# Patient Record
Sex: MALE | Race: WHITE | NOT HISPANIC OR LATINO | Employment: STUDENT | URBAN - METROPOLITAN AREA
[De-identification: names, ages, dates, MRNs, and addresses within clinical notes are randomized per-mention and may not be internally consistent; named-entity substitution may affect disease eponyms.]

---

## 2021-05-06 ENCOUNTER — IMMUNIZATION (OUTPATIENT)
Dept: INTERNAL MEDICINE | Facility: CLINIC | Age: 21
End: 2021-05-06

## 2021-05-06 DIAGNOSIS — Z23 NEED FOR VACCINATION: Primary | ICD-10-CM

## 2021-05-06 PROCEDURE — 0001A COVID-19, MRNA, LNP-S, PF, 30 MCG/0.3 ML DOSE VACCINE: CPT | Mod: CV19,,, | Performed by: INTERNAL MEDICINE

## 2021-05-06 PROCEDURE — 91300 COVID-19, MRNA, LNP-S, PF, 30 MCG/0.3 ML DOSE VACCINE: ICD-10-PCS | Mod: ,,, | Performed by: INTERNAL MEDICINE

## 2021-05-06 PROCEDURE — 0001A COVID-19, MRNA, LNP-S, PF, 30 MCG/0.3 ML DOSE VACCINE: ICD-10-PCS | Mod: CV19,,, | Performed by: INTERNAL MEDICINE

## 2021-05-06 PROCEDURE — 91300 COVID-19, MRNA, LNP-S, PF, 30 MCG/0.3 ML DOSE VACCINE: CPT | Mod: ,,, | Performed by: INTERNAL MEDICINE

## 2021-06-01 ENCOUNTER — IMMUNIZATION (OUTPATIENT)
Dept: PRIMARY CARE CLINIC | Facility: CLINIC | Age: 21
End: 2021-06-01

## 2021-06-01 DIAGNOSIS — Z23 NEED FOR VACCINATION: Primary | ICD-10-CM

## 2021-06-01 PROCEDURE — 0002A COVID-19, MRNA, LNP-S, PF, 30 MCG/0.3 ML DOSE VACCINE: ICD-10-PCS | Mod: CV19,S$GLB,, | Performed by: INTERNAL MEDICINE

## 2021-06-01 PROCEDURE — 0002A COVID-19, MRNA, LNP-S, PF, 30 MCG/0.3 ML DOSE VACCINE: CPT | Mod: CV19,S$GLB,, | Performed by: INTERNAL MEDICINE

## 2021-06-01 PROCEDURE — 91300 COVID-19, MRNA, LNP-S, PF, 30 MCG/0.3 ML DOSE VACCINE: ICD-10-PCS | Mod: S$GLB,,, | Performed by: INTERNAL MEDICINE

## 2021-06-01 PROCEDURE — 91300 COVID-19, MRNA, LNP-S, PF, 30 MCG/0.3 ML DOSE VACCINE: CPT | Mod: S$GLB,,, | Performed by: INTERNAL MEDICINE

## 2025-01-29 ENCOUNTER — HOSPITAL ENCOUNTER (EMERGENCY)
Facility: HOSPITAL | Age: 25
Discharge: LEFT BEFORE DISCHARGE | End: 2025-01-29
Payer: COMMERCIAL

## 2025-01-29 VITALS
WEIGHT: 123.88 LBS | HEART RATE: 69 BPM | OXYGEN SATURATION: 99 % | DIASTOLIC BLOOD PRESSURE: 70 MMHG | TEMPERATURE: 99 F | RESPIRATION RATE: 18 BRPM | SYSTOLIC BLOOD PRESSURE: 119 MMHG

## 2025-01-29 DIAGNOSIS — R55 SYNCOPE: ICD-10-CM

## 2025-01-29 PROCEDURE — 93005 ELECTROCARDIOGRAM TRACING: CPT

## 2025-01-29 PROCEDURE — 99283 EMERGENCY DEPT VISIT LOW MDM: CPT | Mod: 25

## 2025-01-29 PROCEDURE — 93010 ELECTROCARDIOGRAM REPORT: CPT | Mod: ,,, | Performed by: INTERNAL MEDICINE

## 2025-01-30 ENCOUNTER — LAB VISIT (OUTPATIENT)
Dept: LAB | Facility: OTHER | Age: 25
End: 2025-01-30
Payer: COMMERCIAL

## 2025-01-30 ENCOUNTER — OFFICE VISIT (OUTPATIENT)
Dept: INTERNAL MEDICINE | Facility: CLINIC | Age: 25
End: 2025-01-30
Payer: COMMERCIAL

## 2025-01-30 VITALS
OXYGEN SATURATION: 98 % | BODY MASS INDEX: 22.66 KG/M2 | HEART RATE: 77 BPM | WEIGHT: 153 LBS | HEIGHT: 69 IN | SYSTOLIC BLOOD PRESSURE: 118 MMHG | DIASTOLIC BLOOD PRESSURE: 80 MMHG

## 2025-01-30 DIAGNOSIS — Z00.00 ANNUAL PHYSICAL EXAM: ICD-10-CM

## 2025-01-30 DIAGNOSIS — R40.20 LOSS OF CONSCIOUSNESS: Primary | ICD-10-CM

## 2025-01-30 DIAGNOSIS — R40.20 LOSS OF CONSCIOUSNESS: ICD-10-CM

## 2025-01-30 LAB
ALBUMIN SERPL BCP-MCNC: 4.7 G/DL (ref 3.5–5.2)
ALP SERPL-CCNC: 64 U/L (ref 40–150)
ALT SERPL W/O P-5'-P-CCNC: 12 U/L (ref 10–44)
ANION GAP SERPL CALC-SCNC: 11 MMOL/L (ref 8–16)
AST SERPL-CCNC: 16 U/L (ref 10–40)
BASOPHILS # BLD AUTO: 0.07 K/UL (ref 0–0.2)
BASOPHILS NFR BLD: 1 % (ref 0–1.9)
BILIRUB SERPL-MCNC: 1 MG/DL (ref 0.1–1)
BUN SERPL-MCNC: 11 MG/DL (ref 6–20)
CALCIUM SERPL-MCNC: 9.6 MG/DL (ref 8.7–10.5)
CHLORIDE SERPL-SCNC: 103 MMOL/L (ref 95–110)
CHOLEST SERPL-MCNC: 123 MG/DL (ref 120–199)
CHOLEST/HDLC SERPL: 2.2 {RATIO} (ref 2–5)
CO2 SERPL-SCNC: 26 MMOL/L (ref 23–29)
CREAT SERPL-MCNC: 0.8 MG/DL (ref 0.5–1.4)
DIFFERENTIAL METHOD BLD: ABNORMAL
EOSINOPHIL # BLD AUTO: 0.1 K/UL (ref 0–0.5)
EOSINOPHIL NFR BLD: 1.1 % (ref 0–8)
ERYTHROCYTE [DISTWIDTH] IN BLOOD BY AUTOMATED COUNT: 12.5 % (ref 11.5–14.5)
EST. GFR  (NO RACE VARIABLE): >60 ML/MIN/1.73 M^2
ESTIMATED AVG GLUCOSE: 91 MG/DL (ref 68–131)
GLUCOSE SERPL-MCNC: 79 MG/DL (ref 70–110)
HBA1C MFR BLD: 4.8 % (ref 4–5.6)
HCT VFR BLD AUTO: 48.7 % (ref 40–54)
HCV AB SERPL QL IA: NEGATIVE
HDLC SERPL-MCNC: 55 MG/DL (ref 40–75)
HDLC SERPL: 44.7 % (ref 20–50)
HGB BLD-MCNC: 16.9 G/DL (ref 14–18)
HIV 1+2 AB+HIV1 P24 AG SERPL QL IA: NEGATIVE
IMM GRANULOCYTES # BLD AUTO: 0.03 K/UL (ref 0–0.04)
IMM GRANULOCYTES NFR BLD AUTO: 0.4 % (ref 0–0.5)
LDLC SERPL CALC-MCNC: 53.4 MG/DL (ref 63–159)
LYMPHOCYTES # BLD AUTO: 2.2 K/UL (ref 1–4.8)
LYMPHOCYTES NFR BLD: 31.6 % (ref 18–48)
MCH RBC QN AUTO: 31.4 PG (ref 27–31)
MCHC RBC AUTO-ENTMCNC: 34.7 G/DL (ref 32–36)
MCV RBC AUTO: 90 FL (ref 82–98)
MONOCYTES # BLD AUTO: 0.5 K/UL (ref 0.3–1)
MONOCYTES NFR BLD: 6.7 % (ref 4–15)
NEUTROPHILS # BLD AUTO: 4.2 K/UL (ref 1.8–7.7)
NEUTROPHILS NFR BLD: 59.2 % (ref 38–73)
NONHDLC SERPL-MCNC: 68 MG/DL
NRBC BLD-RTO: 0 /100 WBC
OHS QRS DURATION: 86 MS
OHS QTC CALCULATION: 425 MS
PLATELET # BLD AUTO: 249 K/UL (ref 150–450)
PMV BLD AUTO: 10.4 FL (ref 9.2–12.9)
POTASSIUM SERPL-SCNC: 4.1 MMOL/L (ref 3.5–5.1)
PROT SERPL-MCNC: 8.2 G/DL (ref 6–8.4)
RBC # BLD AUTO: 5.39 M/UL (ref 4.6–6.2)
SODIUM SERPL-SCNC: 140 MMOL/L (ref 136–145)
TRIGL SERPL-MCNC: 73 MG/DL (ref 30–150)
TSH SERPL DL<=0.005 MIU/L-ACNC: 1.21 UIU/ML (ref 0.4–4)
WBC # BLD AUTO: 7.02 K/UL (ref 3.9–12.7)

## 2025-01-30 PROCEDURE — 3044F HG A1C LEVEL LT 7.0%: CPT | Mod: CPTII,S$GLB,,

## 2025-01-30 PROCEDURE — 99203 OFFICE O/P NEW LOW 30 MIN: CPT | Mod: S$GLB,,,

## 2025-01-30 PROCEDURE — 82746 ASSAY OF FOLIC ACID SERUM: CPT

## 2025-01-30 PROCEDURE — 99999 PR PBB SHADOW E&M-EST. PATIENT-LVL III: CPT | Mod: PBBFAC,,,

## 2025-01-30 PROCEDURE — 82607 VITAMIN B-12: CPT

## 2025-01-30 PROCEDURE — 1159F MED LIST DOCD IN RCRD: CPT | Mod: CPTII,S$GLB,,

## 2025-01-30 PROCEDURE — 84443 ASSAY THYROID STIM HORMONE: CPT

## 2025-01-30 PROCEDURE — 80053 COMPREHEN METABOLIC PANEL: CPT

## 2025-01-30 PROCEDURE — 1160F RVW MEDS BY RX/DR IN RCRD: CPT | Mod: CPTII,S$GLB,,

## 2025-01-30 PROCEDURE — 3008F BODY MASS INDEX DOCD: CPT | Mod: CPTII,S$GLB,,

## 2025-01-30 PROCEDURE — 87389 HIV-1 AG W/HIV-1&-2 AB AG IA: CPT

## 2025-01-30 PROCEDURE — 36415 COLL VENOUS BLD VENIPUNCTURE: CPT

## 2025-01-30 PROCEDURE — 3079F DIAST BP 80-89 MM HG: CPT | Mod: CPTII,S$GLB,,

## 2025-01-30 PROCEDURE — 80061 LIPID PANEL: CPT

## 2025-01-30 PROCEDURE — 3074F SYST BP LT 130 MM HG: CPT | Mod: CPTII,S$GLB,,

## 2025-01-30 PROCEDURE — 86803 HEPATITIS C AB TEST: CPT

## 2025-01-30 PROCEDURE — 83036 HEMOGLOBIN GLYCOSYLATED A1C: CPT

## 2025-01-30 PROCEDURE — 85025 COMPLETE CBC W/AUTO DIFF WBC: CPT

## 2025-01-30 RX ORDER — KETOCONAZOLE 20 MG/ML
SHAMPOO, SUSPENSION TOPICAL
COMMUNITY
Start: 2025-01-14

## 2025-01-30 RX ORDER — TACROLIMUS 1 MG/G
OINTMENT TOPICAL
COMMUNITY
Start: 2024-12-10

## 2025-01-30 RX ORDER — TRIAMCINOLONE ACETONIDE 1 MG/G
CREAM TOPICAL
COMMUNITY
Start: 2024-09-11

## 2025-01-30 RX ORDER — CLOBETASOL PROPIONATE 0.5 MG/ML
SOLUTION TOPICAL
COMMUNITY

## 2025-01-30 RX ORDER — CLOBETASOL PROPIONATE 0.5 MG/G
1 OINTMENT TOPICAL 2 TIMES DAILY
COMMUNITY
Start: 2025-01-14

## 2025-01-30 NOTE — PROGRESS NOTES
History & Physical  [facility]      SUBJECTIVE:     History of Present Illness:  Patient is a 24 y.o. male presents to clinic to establish care. Current diagnoses include acne vulgaris    #Syncopal episode   He experienced a syncopal episode after having one sip of alcohol at a bar following lunch. Prior to the event, he had consumed food and water. He developed dizziness, slight nausea without emesis, and subsequently lost consciousness for approximately 10 minutes. Upon regaining consciousness, he was taken to the ER where ECG, blood pressure, and oxygen levels were checked. He reports feeling fine by arrival at the ER. ECG completed in ED was indicative of no STEMI, with sinus rhythm with sinus arrhythmia with occasional Premature ventricular complexes. Patient states he is at his baseline today. No prior events of LOC, He has a history of concussion in middle school without loss of consciousness. He experiences dizziness and lightheadedness at doctor's offices, particularly during procedures involving needles.He notes that he was talking with a friend prior to the LOC event about seeing a chiropractor which did elevate his stress and anxiety levels at the time. He is not on any new medications and he has not recently started any new supplements. He consumes alcohol socially on weekends. He previously used marijuana which occasionally caused similar symptoms, but discontinued use 5 years ago. He is sexually active with one partner, uses protection, and denies history of sexually transmitted infections.      Review of patient's allergies indicates:  No Known Allergies    History reviewed. No pertinent past medical history.  History reviewed. No pertinent surgical history.  No family history on file.  Social History     Tobacco Use    Smoking status: Never    Smokeless tobacco: Never        OBJECTIVE:     Vital Signs (Most Recent)  Vitals:    01/30/25 1419   BP: 118/80   BP  "Location: Left arm   Patient Position: Sitting   Pulse: 77   SpO2: 98%   Weight: 69.4 kg (153 lb)   Height: 5' 8.5" (1.74 m)         Physical Exam  Constitutional:       General: He is not in acute distress.     Appearance: Normal appearance. He is not toxic-appearing.   HENT:      Head: Normocephalic and atraumatic.      Right Ear: External ear normal.      Left Ear: External ear normal.      Nose: Nose normal.      Mouth/Throat:      Mouth: Mucous membranes are moist.   Eyes:      Extraocular Movements: Extraocular movements intact.   Cardiovascular:      Rate and Rhythm: Normal rate and regular rhythm.      Pulses: Normal pulses.      Heart sounds: Normal heart sounds.   Pulmonary:      Effort: Pulmonary effort is normal. No respiratory distress.   Abdominal:      General: Abdomen is flat.      Palpations: Abdomen is soft.      Tenderness: There is no abdominal tenderness.   Musculoskeletal:      Cervical back: Normal range of motion and neck supple.   Skin:     General: Skin is warm.      Findings: No bruising or erythema.   Neurological:      General: No focal deficit present.      Mental Status: He is alert.   Psychiatric:         Mood and Affect: Mood normal.           ASSESSMENT/PLAN:   24 y.o.male presents to clinic to establish care.     Assessed recent syncope event, considering various potential causes including vasovagal syncope  Reviewed EKG from ER visit, noting premature ventricular complexes and slightly elevated heart rate, but overall normal,  Offered repeat EKG today, but patient declining scheduling at this time  Evaluated patient's history, noting no recurring fainting episodes or seizures  Determined initial workup with labs appropriate to rule out underlying conditions  Considered but deferred Holter monitor at this time, given normal EKG and lack of recurring symptoms    Pravin was seen today for establish care, annual exam and loss of consciousness.    Diagnoses and all orders for this " visit:    Loss of consciousness  -     VITAMIN B12; Future  -     FOLATE; Future  -     EKG 12-lead; Future    Annual physical exam  -     Hemoglobin A1C; Future  -     CBC Auto Differential; Future  -     Comprehensive Metabolic Panel; Future  -     HIV 1/2 Ag/Ab (4th Gen); Future  -     Hepatitis C Antibody; Future  -     TSH; Future  -     Lipid Panel; Future        Follow-up: 1 year for annual if no further symptoms/events occur or prn     This note was generated with the assistance of ambient listening technology. Verbal consent was obtained by the patient and accompanying visitor(s) for the recording of patient appointment to facilitate this note. I attest to having reviewed and edited the generated note for accuracy, though some syntax or spelling errors may persist. Please contact the author of this note for any clarification.      Jaycob Bellamy MD  [facility]

## 2025-01-30 NOTE — FIRST PROVIDER EVALUATION
Medical screening examination initiated.  I have conducted a focused provider triage encounter, findings are as follows:    Brief history of present illness:  Syncope today while at a bar sitting on a stool.  He only had a sip of alcohol.    No fall or trauma.  No seizure-like activity.    Was in his usual state of health earlier in the day.  Currently says his symptoms are resolved.  Bystanders say that he was out for about 10 minutes and initially had some trouble walking and needed help to get into the car.    Patient says he has passed out before usually in the doctor's office or when watching videos about medical procedures    Vitals:    01/29/25 2234   BP: 119/70   BP Location: Right arm   Pulse: 69   Resp: 18   Temp: 98.5 °F (36.9 °C)   TempSrc: Oral   SpO2: 99%   Weight: 56.2 kg (123 lb 14.4 oz)       Pertinent physical exam:  No acute distress, well-appearing, breathing comfortably, normal gait, interacting normally.    Brief workup plan:  EKG, screening labs.    Preliminary workup initiated; this workup will be continued and followed by the physician or advanced practice provider that is assigned to the patient when roomed.

## 2025-01-31 LAB
FOLATE SERPL-MCNC: 11.7 NG/ML (ref 4–24)
VIT B12 SERPL-MCNC: 808 PG/ML (ref 210–950)

## 2025-06-23 ENCOUNTER — OFFICE VISIT (OUTPATIENT)
Dept: SPORTS MEDICINE | Facility: CLINIC | Age: 25
End: 2025-06-23
Payer: COMMERCIAL

## 2025-06-23 ENCOUNTER — HOSPITAL ENCOUNTER (OUTPATIENT)
Dept: RADIOLOGY | Facility: HOSPITAL | Age: 25
Discharge: HOME | End: 2025-06-23
Attending: PHYSICIAN ASSISTANT
Payer: COMMERCIAL

## 2025-06-23 VITALS
HEART RATE: 57 BPM | DIASTOLIC BLOOD PRESSURE: 85 MMHG | SYSTOLIC BLOOD PRESSURE: 129 MMHG | WEIGHT: 152.44 LBS | HEIGHT: 69 IN | BODY MASS INDEX: 22.58 KG/M2

## 2025-06-23 DIAGNOSIS — M25.511 RIGHT SHOULDER PAIN, UNSPECIFIED CHRONICITY: ICD-10-CM

## 2025-06-23 DIAGNOSIS — M25.311 SHOULDER INSTABILITY, RIGHT: Primary | ICD-10-CM

## 2025-06-23 PROCEDURE — 99203 OFFICE O/P NEW LOW 30 MIN: CPT | Mod: S$GLB,,, | Performed by: PHYSICIAN ASSISTANT

## 2025-06-23 PROCEDURE — 3044F HG A1C LEVEL LT 7.0%: CPT | Mod: CPTII,S$GLB,, | Performed by: PHYSICIAN ASSISTANT

## 2025-06-23 PROCEDURE — 3008F BODY MASS INDEX DOCD: CPT | Mod: CPTII,S$GLB,, | Performed by: PHYSICIAN ASSISTANT

## 2025-06-23 PROCEDURE — 73030 X-RAY EXAM OF SHOULDER: CPT | Mod: 26,RT,, | Performed by: RADIOLOGY

## 2025-06-23 PROCEDURE — 1160F RVW MEDS BY RX/DR IN RCRD: CPT | Mod: CPTII,S$GLB,, | Performed by: PHYSICIAN ASSISTANT

## 2025-06-23 PROCEDURE — 3074F SYST BP LT 130 MM HG: CPT | Mod: CPTII,S$GLB,, | Performed by: PHYSICIAN ASSISTANT

## 2025-06-23 PROCEDURE — 99999 PR PBB SHADOW E&M-EST. PATIENT-LVL IV: CPT | Mod: PBBFAC,,, | Performed by: PHYSICIAN ASSISTANT

## 2025-06-23 PROCEDURE — 3079F DIAST BP 80-89 MM HG: CPT | Mod: CPTII,S$GLB,, | Performed by: PHYSICIAN ASSISTANT

## 2025-06-23 PROCEDURE — 1159F MED LIST DOCD IN RCRD: CPT | Mod: CPTII,S$GLB,, | Performed by: PHYSICIAN ASSISTANT

## 2025-06-23 PROCEDURE — 73030 X-RAY EXAM OF SHOULDER: CPT | Mod: TC,RT

## 2025-06-23 NOTE — PROGRESS NOTES
Subjective:     Chief Complaint: Pravin Bhat is a 24 y.o. male who had concerns including Pain of the Right Shoulder.    History of Present Illness    CHIEF COMPLAINT:  - Right shoulder pain    HPI:  Pravin presents with right shoulder pain that began two years ago following a fall. Pain worsens when raising the arm, particularly with increased elevation. Ongoing soreness persists since the incident. He denies current instability but reports episodes of potential subluxation. He has difficulty describing the sensation due to lack of prior similar experiences.    He engages in various sports activities, including pickleball, basketball, and throwing a football. He uses a Hyperice device for pain relief, which provides quick alleviation of symptoms.    He denies any history of shoulder dislocation or pain in the posterior shoulder. He expresses concern about the condition being degenerative and is willing to engage in PT exercises to address the issue.    PREVIOUS TREATMENTS:  - Hyperice device: Applied to the affected area when pain occurs, provides quick relief.    MEDICATIONS:  - OTC anti-inflammatory medications: As needed for pain relief    WORK STATUS:  - Works in sports betting      ROS:  General: negative fever, negative chills, negative fatigue, negative weight gain, negative weight loss  Eyes: negative vision changes, negative redness, negative discharge  ENT: negative ear pain, negative nasal congestion, negative sore throat  Cardiovascular: negative chest pain, negative palpitations, negative lower extremity edema  Respiratory: negative cough, negative shortness of breath  Gastrointestinal: negative abdominal pain, negative nausea, negative vomiting, negative diarrhea, negative constipation, negative blood in stool  Genitourinary: negative dysuria, negative hematuria, negative frequency  Musculoskeletal: negative joint pain, negative muscle pain, +limb pain  Skin: negative rash, negative  lesion  Neurological: negative headache, negative dizziness, negative numbness, negative tingling, +lightheadedness  Psychiatric: +anxiety, negative depression, negative sleep difficulty                     Past Medical History[1]     History reviewed. No pertinent surgical history.    Objective:     General: Pravin is well-developed, well-nourished, appears stated age, in no acute distress, alert and oriented to time, place and person.     General    Nursing note and vitals reviewed.  Constitutional: He is oriented to person, place, and time. He appears well-developed and well-nourished. No distress.   HENT:   Head: Normocephalic and atraumatic.   Nose: Nose normal.   Eyes: Conjunctivae and EOM are normal. Pupils are equal, round, and reactive to light.   Cardiovascular:  Normal rate, regular rhythm and intact distal pulses.            Pulmonary/Chest: Effort normal and breath sounds normal.   Abdominal: Soft. Bowel sounds are normal. He exhibits no distension.   Neurological: He is alert and oriented to person, place, and time. He has normal reflexes.   Psychiatric: He has a normal mood and affect. His behavior is normal. Judgment and thought content normal.         Right Shoulder Exam     Inspection/Observation   Swelling: absent  Bruising: absent  Scars: absent  Deformity: absent  Scapular Winging: absent  Scapular Dyskinesia: negative  Atrophy: absent    Tenderness   The patient is experiencing no tenderness.    Range of Motion   Active abduction:  150   Passive abduction:  150   Extension:  50   Forward Flexion:  180   Forward Elevation: 180  Adduction: 30   Internal rotation 0 degrees:  T6   Internal rotation 90 degrees:  90     Tests & Signs   Apprehension: negative  Cross arm: negative  Drop arm: negative  Prasad test: negative  Impingement: negative  Lift Off Sign: negative  Belly Press: negative  Active Compression Test ([place]'s Sign): positive  Yergason's Test: negative  Speed's Test:  negative  Relocation 90 degrees: negative  Jerk Test: postive    Other   Sensation: normal    Left Shoulder Exam     Inspection/Observation   Swelling: absent  Bruising: absent  Scars: absent  Deformity: absent  Scapular Winging: absent  Scapular Dyskinesia: negative  Atrophy: absent    Tenderness   The patient is experiencing no tenderness.     Range of Motion   Active abduction:  150   Passive abduction:  150   Extension:  50   Forward Flexion:  180   Forward Elevation: 180  Adduction: 30   Internal rotation 0 degrees:  T6   Internal rotation 90 degrees:  90     Tests & Signs   Apprehension: negative  Cross arm: negative  Drop arm: negative  Prasad test: negative  Impingement: negative  Lift Off Sign: negative  Belly Press: negative  Active Compression test ([place]'s Sign): negative  Yergasons's Test: negative  Speed's Test: negative  Relocation 90 degrees: negative  Jerk Test: negative    Other   Sensation: normal       Muscle Strength   Right Upper Extremity   Shoulder Abduction: 5/5   Shoulder Internal Rotation: 5/5   Shoulder External Rotation: 5/5   Supraspinatus: 5/5   Subscapularis: 5/5   Biceps: 5/5   Left Upper Extremity  Shoulder Abduction: 5/5   Shoulder Internal Rotation: 5/5   Shoulder External Rotation: 5/5   Supraspinatus: 5/5   Subscapularis: 5/5   Biceps: 5/5     Vascular Exam     Right Pulses      Radial:                    2+      Left Pulses      Radial:                    2+      Capillary Refill  Right Hand: normal capillary refill  Left Hand: normal capillary refill          Radiographic findings:    X-ray Shoulder 2 or More Views Right  Narrative: EXAMINATION:  XR SHOULDER COMPLETE 2 OR MORE VIEWS RIGHT    CLINICAL HISTORY:  Pain in right shoulder    TECHNIQUE:  Two or three views of the right shoulder were performed.    COMPARISON:  None    FINDINGS:  No fracture or dislocation.  No bone destruction identified.  Impression: See above    Electronically signed by: Jaycob Simmons  MD  Date:    06/23/2025  Time:    13:19            These findings were discussed and reviewed with the patient.     Assessment:     Encounter Diagnoses   Name Primary?    Shoulder instability, right Yes    Right shoulder pain, unspecified chronicity         Plan:     Assessment & Plan    PROCEDURES:  1. # Procedures    PATIENT INSTRUCTIONS:  1. Avoid explosive stress on the shoulder, such as kipping pull-ups or Olympic weightlifting.  2. Use Hyperice device for pain.    MEDICATIONS:  1. Use OTC anti-inflammatories as needed for pain.    REFERRALS:  1. Referred to PT for shoulder strengthening exercises.    FOLLOW UP:  1. Contact the office if pain becomes severe, indicating possible need for surgery.           All of the patient's questions were answered and the patient will contact us if they have any questions or concerns in the interim.    This note was generated with the assistance of ambient listening technology. Verbal consent was obtained by the patient and accompanying visitor(s) for the recording of patient appointment to facilitate this note. I attest to having reviewed and edited the generated note for accuracy, though some syntax or spelling errors may persist. Please contact the author of this note for any clarification.             [1] No past medical history on file.

## 2025-06-26 ENCOUNTER — CLINICAL SUPPORT (OUTPATIENT)
Dept: REHABILITATION | Facility: OTHER | Age: 25
End: 2025-06-26
Payer: COMMERCIAL

## 2025-06-26 DIAGNOSIS — G89.29 CHRONIC RIGHT SHOULDER PAIN: Primary | ICD-10-CM

## 2025-06-26 DIAGNOSIS — M25.511 CHRONIC RIGHT SHOULDER PAIN: Primary | ICD-10-CM

## 2025-06-26 PROCEDURE — 97161 PT EVAL LOW COMPLEX 20 MIN: CPT | Mod: PN

## 2025-06-26 PROCEDURE — 97530 THERAPEUTIC ACTIVITIES: CPT | Mod: PN

## 2025-06-26 NOTE — PATIENT INSTRUCTIONS
Access Code: [email or web address]  URL: [email or web address]  Date: 06/26/2025  Prepared by: Caridad Masterson    Exercises  - Prone Scapular Retraction  - 1 x daily - 2 sets - 10 reps  - Prone Scapular Slide with Shoulder Extension  - 1 x daily - 2 sets - 10 reps  - Prone Scapular Retraction Arms at Side  - 1 x daily - 2 sets - 10 reps - 5 hold  - Prone W Scapular Retraction  - 1 x daily - 2 sets - 10 reps  - Prone Single Arm Shoulder Y  - 1 x daily - 3 sets - 10 reps  - Shoulder External Rotation Reactive Isometrics  - 1 x daily - 2 sets - 10 reps  - Shoulder Internal Rotation Reactive Isometrics  - 1 x daily - 2 sets - 10 reps  - Isometric Standing Shoulder External Rotation in Abduction  - 1 x daily - 3-5 x weekly - 2 sets - 10 reps  - Isometric Standing Shoulder Internal Rotation - 90 Degrees Abduction  - 1 x daily - 3-5 x weekly - 2 sets - 10 reps

## 2025-06-26 NOTE — PROGRESS NOTES
Outpatient Rehab    Physical Therapy Evaluation    Patient Name: Pravin Bhat  MRN: [id number]  YOB: 2000  Encounter Date: 6/26/2025    Therapy Diagnosis:   Encounter Diagnosis   Name Primary?    Chronic right shoulder pain Yes     Physician: Mo Alfred, *    Physician Orders: Eval and Treat  Medical Diagnosis: Shoulder instability, right  Right shoulder pain, unspecified chronicity  Surgical Diagnosis: Not applicable for this Episode   Surgical Date: Not applicable for this Episode  Days Since Last Surgery: Not applicable for this Episode    Visit # / Visits Authorized:  1 / 1  Insurance Authorization Period: 6/23/2025 to 6/23/2026  Date of Evaluation: 6/26/2025  Plan of Care Certification: 6/26/2025 to 8/7/2025     Time In: 1306   Time Out: 1345  Total Time (in minutes): 39   Total Billable Time (in minutes): 15    Intake Outcome Measure for FOTO Survey    Therapist reviewed FOTO scores for Pravin Bhat on 6/26/2025.   FOTO report - see Media section or FOTO account episode details.     Intake Score: 64 (DISABILITIES OF THE ARM SHOULDER AND HAND (DASH): 17.5 (higher score = greater disability))%    Precautions:       Subjective   History of Present Illness  Pravin is a 24 y.o. male who reports to physical therapy with a chief concern of R shoulder instability.     The patient reports a medical diagnosis of M25.311 (ICD-10-CM) - Shoulder instability, right  M25.511 (ICD-10-CM) - Right shoulder pain, unspecified chronicity.    Diagnostic tests related to this condition: X-ray.   X-Ray Details: FINDINGS:  No fracture or dislocation.  No bone destruction identified.    History of Present Condition/Illness: History of current condition - Pravin reports: having a shoulder injury a few years ago - playing basketball and came down with his right arm behind him no dislocation but he couldn't move the arm for about a day which gradually improved. Notes having problems when throwing a  ball or shooting a basketball. He has a friend who does therapy for baseball and recommended he follow up with a doctor and come in. Denies other injuries to the shoulder. In general less mobile with shoulder motions but overall is okay with day to day activities. Sleep is not affected.  Falls in the last 12 months: no Burning, tingling, numbness: no Popping, clicking, locking, feeling of instability at the shoulder: instability after use for long periods of time. R Hand dominant Fine motor skill deficits: no  Patient denies dizziness, headaches, blurry vision, nausea, vomiting, impaired sensations in groin and saddle region, changes in bowel/bladder, and unexplained changes in weight.     Activities of Daily Living  Social history was obtained from Patient.       General Current Level of Function Comments: limited with sports and throwing, flexibility behind his body, and carrying heavy things.           Pain     Patient reports a current pain level of 0/10. Pain at best is reported as 0/10. Pain at worst is reported as 3/10.   Location: R shoulder  Clinical Progression (since onset): Unchanged  Pain Qualities: Dull, Aching, Other (Comment)  Other Pain Qualities: unstable  Pain-Relieving Factors: Ice  Pain-Aggravating Factors: Sports, Lifting         Living Arrangements  Living Situation  Housing: Home independently        Employment  Patient does not report that: Does the patient's condition impact their ability to work?  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       Past Medical History/Physical Systems Review:   Pravin Bhat  has no past medical history on file.    Pravin Bhat  has no past surgical history on file.    Pravin has a current medication list which includes the following prescription(s): clobetasol, clobetasol 0.05%, ketoconazole, tacrolimus, and triamcinolone acetonide 0.1%.    Review of patient's allergies indicates:  No Known  Allergies     Objective      Shoulder Palpation  Right Shoulder Palpation  Unremarkable: Muscle, Bony Prominence/Bursa, and Tendon/Ligament          Left Shoulder Palpation  Unremarkable: Muscle, Bony Prominence/Bursa, and Tendon/Ligament              Subcranial Range of Motion   Active Restricted? Passive Restricted? Pain   Flexion         Protraction         Retraction           Cervical Range of Motion   Active (deg) Passive (deg) Pain   Flexion 100 100     Extension 100 100     Right Lateral Flexion 100 100     Right Rotation 100 100     Left Lateral Flexion 100 100     Left Rotation 100 100       Above numbers indicate percentage of full ROM.    Shoulder Range of Motion  Right Shoulder   Active (deg) Passive (deg) Pain   Flexion 180 180     Extension 20 20     Scaption         ABduction 180 180     ADduction         Horizontal ABduction         Horizontal ADduction         External Rotation (Shoulder ABducted 0 degrees)         External Rotation (Shoulder ABducted 45 degrees)         External Rotation (Shoulder ABducted 90 degrees)   90     Internal Rotation (Shoulder ABducted 0 degrees)         Internal Rotation (Shoulder ABducted 45 degrees)         Internal Rotation (Shoulder ABducted 90 degrees)           Left Shoulder   Active (deg) Passive (deg) Pain   Flexion 180 180     Extension 20 20     Scaption         ABduction 180 180     ADduction         Horizontal ABduction         Horizontal ADduction         External Rotation (Shoulder ABducted 0 degrees)         External Rotation (Shoulder ABducted 45 degrees)         External Rotation (Shoulder ABducted 90 degrees)   90     Internal Rotation (Shoulder ABducted 0 degrees)         Internal Rotation (Shoulder ABducted 45 degrees)         Internal Rotation (Shoulder ABducted 90 degrees)             Shoulder, Elbow, or Forearm Range of Motion Details: Functional internal rotation: right: T10 with pain; left: T8     Elbow/Forearm Range of Motion   Right  Elbow/Forearm   Active (deg) Passive (deg) Pain   Flexion 120 120     Extension 0 0     Forearm Pronation         Forearm Supination           Left Elbow/Forearm   Active (deg) Passive (deg) Pain   Flexion 120 120     Extension 0 0     Forearm Pronation         Forearm Supination                       Shoulder Strength - Planes of Motion   Right Strength Right Pain Left Strength Left  Pain   Flexion 5   5     Extension 5   5     ABduction 5   5     ADduction 5   5     Horizontal ABduction           Horizontal ADduction           Internal Rotation 0° 5   5     Internal Rotation 90° 4+   5     External Rotation 0° 5   5     External Rotation 90° 4 Yes 4+         Shoulder Strength - Scapular Stabilizing Muscles   Right Strength Right Pain Left Strength Left  Pain   Lower Trapezius 3+   3+     Middle Trapezius 4   4     Rhomboids 4+   4+                    Treatment:  Therapeutic Activity  TA 1: HEP review, performance and education - Prone Scapular Retraction  - 1 x daily - 2 sets - 10 reps  - Prone Scapular Slide with Shoulder Extension  - 1 x daily - 2 sets - 10 reps  - Prone Scapular Retraction Arms at Side  - 1 x daily - 2 sets - 10 reps - 5 hold  - Prone W Scapular Retraction  - 1 x daily - 2 sets - 10 reps  - Prone Single Arm Shoulder Y  - 1 x daily - 3 sets - 10 reps  - Shoulder External Rotation Reactive Isometrics  - 1 x daily - 2 sets - 10 reps  - Shoulder Internal Rotation Reactive Isometrics  - 1 x daily - 2 sets - 10 reps  - Isometric Standing Shoulder External Rotation in Abduction  - 1 x daily - 3-5 x weekly - 2 sets - 10 reps  - Isometric Standing Shoulder Internal Rotation - 90 Degrees Abduction  - 1 x daily - 3-5 x weekly - 2 sets - 10 reps    Time Entry(in minutes):  PT Evaluation (Low) Time Entry: 24  Therapeutic Activity Time Entry: 15    Assessment & Plan   Assessment  Pravin presents with a condition of Low complexity.   Presentation of Symptoms: Stable  Will Comorbidities Impact Care: No        Functional Limitations: Activity tolerance, Completing self-care activities, Proprioception, Range of motion, Participating in leisure activities, Pain with ADLs/IADLs, Gross motor coordination  Impairments: Pain with functional activity, Impaired physical strength, Activity intolerance, Lack of appropriate home exercise program    Patient Goal for Therapy (PT): learn exercises he can do, only here for about 3 weeks.  Prognosis: Excellent  Assessment Details: Patient demonstrates deficits with proprioception, strength, and function that limit ability to participate in school, work, and recreational activities. They would benefit from skilled PT services to normalize kinetic chain mobility, strength, and function to safely return to their prior level of activity.    Plan  From a physical therapy perspective, the patient would benefit from: Skilled Rehab Services    Planned therapy interventions include: Therapeutic exercise, Therapeutic activities, Neuromuscular re-education, and Manual therapy.            Visit Frequency: 1 times Per Week for 6 Weeks.       This plan was discussed with Patient.   Discussion participants: Agreed Upon Plan of Care  Plan details: Frequency and duration of treatment to be adjusted as needed. Patient will be seen by a physical therapist minimally every 30 days.          The patient's spiritual, cultural, and educational needs were considered, and the patient is agreeable to the plan of care and goals.     Education  Education was done with Patient.           Patient was educated on initial evaluation findings and expectations as well as future PT services, procedures, and expectations for optimal compliance with therapy. Home exercise program review, performance, form and purpose; est good home exercise program to continue at home with return to therapy as needed in the future.        Goals:   Active       Functional outcome       Patient will show a significant change in Focus On  therapeutic Outcomes (FOTO) patient-reported outcome tool to demonstrate subjective improvement       Start:  06/26/25    Expected End:  08/07/25            Patient will demonstrate independence in home program for support of progression       Start:  06/26/25    Expected End:  08/07/25               Hand and arm use       Patient will throw a ball with max pain of 2/10 to return to prior level of function and participation in sports.        Start:  06/26/25    Expected End:  08/07/25               Pain       Patient will report pain of 2/10 demonstrating a reduction of overall pain       Start:  06/26/25    Expected End:  08/07/25               Strength       Patient will achieve bilateral shoulder and scapular strength of 5/5       Start:  06/26/25    Expected End:  08/07/25                Caridad Masterson PT

## 2025-06-30 PROBLEM — G89.29 CHRONIC RIGHT SHOULDER PAIN: Status: ACTIVE | Noted: 2025-06-26

## 2025-06-30 PROBLEM — M25.511 CHRONIC RIGHT SHOULDER PAIN: Status: ACTIVE | Noted: 2025-06-26

## 2025-07-01 ENCOUNTER — CLINICAL SUPPORT (OUTPATIENT)
Dept: REHABILITATION | Facility: OTHER | Age: 25
End: 2025-07-01
Payer: COMMERCIAL

## 2025-07-01 DIAGNOSIS — M25.511 CHRONIC RIGHT SHOULDER PAIN: Primary | ICD-10-CM

## 2025-07-01 DIAGNOSIS — G89.29 CHRONIC RIGHT SHOULDER PAIN: Primary | ICD-10-CM

## 2025-07-01 PROCEDURE — 97112 NEUROMUSCULAR REEDUCATION: CPT | Mod: PN

## 2025-07-01 PROCEDURE — 97110 THERAPEUTIC EXERCISES: CPT | Mod: PN

## 2025-07-01 NOTE — PROGRESS NOTES
Outpatient Rehab    Physical Therapy Visit    Patient Name: Pravin Bhat  MRN: [id number]  YOB: 2000  Encounter Date: 7/1/2025    Therapy Diagnosis:   Encounter Diagnosis   Name Primary?    Chronic right shoulder pain Yes     Physician: Mo Alfred, *    Physician Orders: Eval and Treat  Medical Diagnosis: Shoulder instability, right  Right shoulder pain, unspecified chronicity  Surgical Diagnosis: Not applicable for this Episode   Surgical Date: Not applicable for this Episode  Days Since Last Surgery: Not applicable for this Episode    Visit # / Visits Authorized:  1 / 10  Insurance Authorization Period: 6/26/2025 to 12/31/2025  Date of Evaluation: 6/26/2025  Plan of Care Certification: 6/26/2025 to 8/7/2025      PT/PTA: PT   Number of PTA visits since last PT visit:0  Time In: 1400   Time Out: 1500  Total Time (in minutes): 60   Total Billable Time (in minutes): 60    FOTO:  Intake Score:  %  Survey Score 2:  %  Survey Score 3:  %    Precautions:     Standard      Subjective   Slight discomfort with 90/90 ER walkouts in shoulder at home.  Pain reported as 0/10. Right shoulder    Objective            Treatment:  Therapeutic Exercise  TE 1: UBE 4' forward/4'back lvl 2  Balance/Neuromuscular Re-Education  NMR 1: Prone: scapular retractions 2x10 ; Is 2x10 ; Ts 2x10 ; Ws 2x10 ; unilateral Ys 2x10 B  NMR 2: Supine: serratus press vs orange loop 2x10 ; flexion vs orange loop 2x10  NMR 3: ball circles at wall x20 cw/ccw  NMR 4: ER/IR walkouts vs GTB 2x10 ; 90/90 ER walkouts vs YTB 2x10 ; 90/90 IR walkouts vs RTB 2x10 ; Iso ER with shoulder flexion vs RTB 2x10 ; Iso IR with shoulder flexion vs RTB 2x10 (consider nv: genies)    Time Entry(in minutes):  Neuromuscular Re-Education Time Entry: 52  Therapeutic Exercise Time Entry: 8    Assessment & Plan   Assessment: Reviewed home exercise program and modifications for home including decreased resistance and review of form for 90/90 isometric  external rotation/internal rotation walk outs with difficulty maintaining proper form and pain that improved with transition from red to yellow resistance. Continue with scapular stability as tolerated.   Evaluation/Treatment Tolerance: Patient tolerated treatment well    The patient will continue to benefit from skilled outpatient physical therapy in order to address the deficits listed in the problem list on the initial evaluation, provide patient and family education, and maximize the patients level of independence in the home and community environments.     The patient's spiritual, cultural, and educational needs were considered, and the patient is agreeable to the plan of care and goals.     Education  Education was done with Patient.           Home exercise program review; modifications and progressions        Plan: Continue with established PT POC and progress per pt tolerance. continue with scapular stability as tolerated.    Goals:   Active       Functional outcome       Patient will show a significant change in Focus On therapeutic Outcomes (FOTO) patient-reported outcome tool to demonstrate subjective improvement       Start:  06/26/25    Expected End:  08/07/25            Patient will demonstrate independence in home program for support of progression       Start:  06/26/25    Expected End:  08/07/25               Hand and arm use       Patient will throw a ball with max pain of 2/10 to return to prior level of function and participation in sports.        Start:  06/26/25    Expected End:  08/07/25               Pain       Patient will report pain of 2/10 demonstrating a reduction of overall pain       Start:  06/26/25    Expected End:  08/07/25               Strength       Patient will achieve bilateral shoulder and scapular strength of 5/5       Start:  06/26/25    Expected End:  08/07/25                Caridad Masterson, PT

## 2025-07-08 ENCOUNTER — CLINICAL SUPPORT (OUTPATIENT)
Dept: REHABILITATION | Facility: OTHER | Age: 25
End: 2025-07-08
Payer: COMMERCIAL

## 2025-07-08 DIAGNOSIS — M25.511 CHRONIC RIGHT SHOULDER PAIN: Primary | ICD-10-CM

## 2025-07-08 DIAGNOSIS — G89.29 CHRONIC RIGHT SHOULDER PAIN: Primary | ICD-10-CM

## 2025-07-08 PROCEDURE — 97112 NEUROMUSCULAR REEDUCATION: CPT | Mod: PN

## 2025-07-08 PROCEDURE — 97110 THERAPEUTIC EXERCISES: CPT | Mod: PN

## 2025-07-08 NOTE — PROGRESS NOTES
Outpatient Rehab    Physical Therapy Visit    Patient Name: Pravin Bhat  MRN: [id number]  YOB: 2000  Encounter Date: 7/8/2025    Therapy Diagnosis:   Encounter Diagnosis   Name Primary?    Chronic right shoulder pain Yes     Physician: Mo Alfred, *    Physician Orders: Eval and Treat  Medical Diagnosis: Shoulder instability, right  Right shoulder pain, unspecified chronicity  Surgical Diagnosis: Not applicable for this Episode   Surgical Date: Not applicable for this Episode  Days Since Last Surgery: Not applicable for this Episode    Visit # / Visits Authorized:  2 / 10  Insurance Authorization Period: 6/26/2025 to 12/31/2025  Date of Evaluation: 6/26/2025  Plan of Care Certification: 6/26/2025 to 8/7/2025      PT/PTA: PT   Number of PTA visits since last PT visit:0  Time In: 1405   Time Out: 1500  Total Time (in minutes): 55   Total Billable Time (in minutes): 55    FOTO:  Intake Score:  %  Survey Score 2:  %  Survey Score 3:  %    Precautions:       Subjective   Felt fatigued after last session. Completed HEP without issues after modifying with yellow band for ER walk outs..  Pain reported as 0/10. Right shoulder    Objective            Treatment:  Therapeutic Exercise  TE 1: UBE 4' forward/4'back lvl 2  Balance/Neuromuscular Re-Education  NMR 4: 90/90 ER walkouts vs YTB 2x10 ; 90/90 IR walkouts vs RTB 2x10 ; Iso ER with shoulder flexion vs RTB 2x10 ; Iso IR with shoulder flexion vs RTB 2x10; genies ER/IR with RTB 2x 10 ; ER/IR 90/90 ABD vs. RTB 2x 10  NMR 5: Supine ER/IR at 90/90 with GTB x20 each ; prone ER ball catch x30 seconds ; Prone ER with 2# weight x20  NMR 6: Shoulder flexion with ER pulses vs. BTB 2x 10 ; W up and over with BTB x20    Time Entry(in minutes):  Neuromuscular Re-Education Time Entry: 47  Therapeutic Exercise Time Entry: 8    Assessment & Plan   Assessment: Continued with shoulder and scapular stabilization with exercises in throwing positions today with  difficulty maintaining appropriate scapular positioning. Pain with external rotation ball toss in prone so exercise was stopped. Patient noted more fatigue and muscle burn throughout session. Updated home exercise program today. Continued to progress with shoulder stability.        The patient will continue to benefit from skilled outpatient physical therapy in order to address the deficits listed in the problem list on the initial evaluation, provide patient and family education, and maximize the patients level of independence in the home and community environments.     The patient's spiritual, cultural, and educational needs were considered, and the patient is agreeable to the plan of care and goals.             Plan: Continue with established PT POC and progress per pt tolerance. continue with scapular stability as tolerated.    Goals:   Active       Functional outcome       Patient will show a significant change in Focus On therapeutic Outcomes (FOTO) patient-reported outcome tool to demonstrate subjective improvement       Start:  06/26/25    Expected End:  08/07/25            Patient will demonstrate independence in home program for support of progression       Start:  06/26/25    Expected End:  08/07/25               Hand and arm use       Patient will throw a ball with max pain of 2/10 to return to prior level of function and participation in sports.        Start:  06/26/25    Expected End:  08/07/25               Pain       Patient will report pain of 2/10 demonstrating a reduction of overall pain       Start:  06/26/25    Expected End:  08/07/25               Strength       Patient will achieve bilateral shoulder and scapular strength of 5/5       Start:  06/26/25    Expected End:  08/07/25                Caridad Masterson, PT

## 2025-07-14 NOTE — PATIENT INSTRUCTIONS
Access Code: [email or web address]  URL: [email or web address]  Date: 07/13/2025  Prepared by: Caridad Masterson    Exercises  - Prone Scapular Retraction  - 1 x daily - 2 sets - 10 reps  - Prone Scapular Slide with Shoulder Extension  - 1 x daily - 2 sets - 10 reps  - Prone Scapular Retraction Arms at Side  - 1 x daily - 2 sets - 10 reps - 5 hold  - Prone W Scapular Retraction  - 1 x daily - 2 sets - 10 reps  - Prone Single Arm Shoulder Y  - 1 x daily - 3 sets - 10 reps  - Shoulder External Rotation Reactive Isometrics  - 1 x daily - 2 sets - 10 reps  - Shoulder Internal Rotation Reactive Isometrics  - 1 x daily - 2 sets - 10 reps  - Isometric Standing Shoulder External Rotation in Abduction  - 1 x daily - 3-5 x weekly - 2 sets - 10 reps  - Isometric Standing Shoulder Internal Rotation - 90 Degrees Abduction  - 1 x daily - 3-5 x weekly - 2 sets - 10 reps  - Standing Wall Ball Circles with Mini Swiss Ball  - 1 x daily - 1 sets - 2 reps - 30 seconds hold  - Prone Shoulder External Rotation  - 1 x daily - 3-5 x weekly - 2 sets - 10 reps  - Shoulder Flexion Serratus Activation with Resistance  - 1 x daily - 2 sets - 10 reps  - Seated Resisted Shoulder External Rotation in Abduction Supported  - 1 x daily - 3-5 x weekly - 2 sets - 10 reps  - Standing Single Arm Shoulder Internal Rotation in Abduction with Anchored Resistance  - 1 x daily - 3-5 x weekly - 2 sets - 10 reps  - Standing Shoulder External Rotation in Abduction with Anchored Resistance  - 1 x daily - 3 sets - 10 reps  - Standing Shoulder External Rotation with Resistance at 45 Degrees of Abduction  - 1 x daily - 3-5 x weekly - 2 sets - 10 reps

## 2025-07-15 ENCOUNTER — CLINICAL SUPPORT (OUTPATIENT)
Dept: REHABILITATION | Facility: OTHER | Age: 25
End: 2025-07-15
Payer: COMMERCIAL

## 2025-07-15 DIAGNOSIS — G89.29 CHRONIC RIGHT SHOULDER PAIN: Primary | ICD-10-CM

## 2025-07-15 DIAGNOSIS — M25.511 CHRONIC RIGHT SHOULDER PAIN: Primary | ICD-10-CM

## 2025-07-15 PROCEDURE — 97530 THERAPEUTIC ACTIVITIES: CPT | Mod: PN

## 2025-07-15 PROCEDURE — 97112 NEUROMUSCULAR REEDUCATION: CPT | Mod: PN

## 2025-07-15 NOTE — PROGRESS NOTES
Outpatient Rehab    Physical Therapy Discharge    Patient Name: Pravin Bhat  MRN: [id number]  YOB: 2000  Encounter Date: 7/15/2025    Therapy Diagnosis:   Encounter Diagnosis   Name Primary?    Chronic right shoulder pain Yes     Physician: Mo Alfred, *    Physician Orders: Eval and Treat  Medical Diagnosis: Shoulder instability, right  Right shoulder pain, unspecified chronicity  Surgical Diagnosis: Not applicable for this Episode   Surgical Date: Not applicable for this Episode  Days Since Last Surgery: Not applicable for this Episode    Visit # / Visits Authorized:  3 / 10  Insurance Authorization Period: 6/26/2025 to 12/31/2025  Date of Evaluation: 6/26/2025  Plan of Care Certification: 6/26/2025 to 8/7/2025      PT/PTA: PT   Number of PTA visits since last PT visit:0  Time In: 1403   Time Out: 1500  Total Time (in minutes): 57   Total Billable Time (in minutes): 55    FOTO:  Intake Score: 64%  Survey Score 2: 77%  Survey Score 3: Not applicable for this Episode%    Precautions:  Additional Precautions and Protocol Details: Standard    Subjective   Feels pretty good with all the exercises. He would like an updated copy of exercises to perform at home. Today is his last day as he is leaving town next week and is not sure when he will be back..  Pain reported as 0/10. Right shoulder    Objective      Shoulder Strength - Planes of Motion   Right Strength Right Pain Left Strength Left  Pain   Flexion 5   5     Extension 5   5     ABduction 5   5     ADduction 5   5     Horizontal ABduction           Horizontal ADduction           Internal Rotation 0° 5   5     Internal Rotation 90°           External Rotation 0° 5   5     External Rotation 90° 4+ Yes 4+         Shoulder Strength - Scapular Stabilizing Muscles   Right Strength Right Pain Left Strength Left  Pain   Lower Trapezius 3+   3+     Middle Trapezius 4   4     Rhomboids 4+   4+                     Treatment:  Balance/Neuromuscular Re-Education  NMR 1: With orange loop: scapular clocks x8 B ; serratus slide at wall x15 ; lateral wall walks x3 laps ;  NMR 2: Thumb tacks x20 ; Wall angels x15  NMR 3: ER circuit with orange loop x3 rounds: Iso ER in neutral x30 seconds ; Iso ER into flexion x10 ; Iso ER in 90 degrees flexion x30 seconds  NMR 4: with BTB: supine diagonals x20 B ; Horizontal ABD with palms down x20 ; Unilateral horizontal ABD with palms up x10 B ; no money x20  Therapeutic Activity  TA 1: re-assessment/FOTO/review of HEP ; education about modifications and progressions    Time Entry(in minutes):  Neuromuscular Re-Education Time Entry: 45  Therapeutic Activity Time Entry: 10    Assessment & Plan   Assessment: Progressed scapular exercises today with updated home exercise program provided. All exercises focused on things he can do with bands/minimal equipment as he will be traveling and have limited access to regular gym facilities. Since starting therapy he feels overall stronger and better equipped to maintain and progress shoulder and scapular stability to reduce pain. Limited sessions due to leaving town but discussed return with new referral. At today's visit, patient is appropriate for discharge with updated home exercise program and bands provided as this was his main goal for therapy.   Evaluation/Treatment Tolerance: Patient tolerated treatment well    The patient's spiritual, cultural, and educational needs were considered, and the patient is agreeable to the plan of care and goals.     Education  Education was done with Patient. The patient's learning style includes Demonstration, Listening, and Pictures/video. The patient Demonstrates understanding and Verbalizes understanding.         Educated in home exercise program, progressions and performance ; bands to use; returning to therapy with new referral as needed       Plan: Patient is discharged at today's visit with updated HEP. Will  contact MD for new referral as needed to return.    Goals:   Active       Hand and arm use       Patient will throw a ball with max pain of 2/10 to return to prior level of function and participation in sports.  (Adequate for Care Transition)       Start:  06/26/25    Expected End:  08/07/25               Pain       Patient will report pain of 2/10 demonstrating a reduction of overall pain (Adequate for Care Transition)       Start:  06/26/25    Expected End:  08/07/25               Strength       Patient will achieve bilateral shoulder and scapular strength of 5/5 (Adequate for Care Transition)       Start:  06/26/25    Expected End:  08/07/25              Resolved       Functional outcome       Patient will show a significant change in Focus On therapeutic Outcomes (FOTO) patient-reported outcome tool to demonstrate subjective improvement (Met)       Start:  06/26/25    Expected End:  08/07/25    Resolved:  07/15/25         Patient will demonstrate independence in home program for support of progression (Met)       Start:  06/26/25    Expected End:  08/07/25    Resolved:  07/15/25             Caridad Masterson PT